# Patient Record
(demographics unavailable — no encounter records)

---

## 2024-12-27 NOTE — DATA REVIEWED
[FreeTextEntry1] : 12/27/2024 bilateral lower extremities venous duplex no acute dvt or svt RLE insuff sfj to gsv prox calf LLE insuff sfj to gsv distal calf

## 2024-12-27 NOTE — ASSESSMENT
[Arterial/Venous Disease] : arterial/venous disease [FreeTextEntry1] : Impression - asymptomatic venous insufficiency, nocturnal LLE pain relieved with walking not related to vascular  seen and examined with Dr. Chavez  Plan Conservative medical management - leg elevation, exercise regimen, calf muscle exercise, knee high 20-30 mm hg compression stockings Advised to follow up with PCP or neuro regarding LLE pain at rest Return to office in 1 year Dec 2025 to re-evaluate bilateral lower extremities with VI study.

## 2024-12-27 NOTE — HISTORY OF PRESENT ILLNESS
[FreeTextEntry1] : Pt presents to the office to evaluate bilateral lower extremities. History of venous insufficiency and left ankle/foot swelling. Left ankle and foot swelling improved. He complains of daily nocturnal LLE pain from the thigh down to the foot when he sleeps. Pain improves when he gets up to walk. He denies claudication, rest pain or wounds. Denies leg swelling. He is compliant with compression stockings. He is physically active and plays pickleball. He takes low dose aspirin. He previously took Eliquis for afib but was discontinued after he developed intracranial hemorrhage. He has a Watchman device. Nonsmoker.

## 2024-12-27 NOTE — PHYSICAL EXAM
[2+] : left 2+ [Ankle Swelling Bilaterally] : bilaterally  [Ankle Swelling (On Exam)] : present [Varicose Veins Of Lower Extremities] : bilaterally [] : bilaterally [No Rash or Lesion] : No rash or lesion [Ankle Swelling On The Right] : mild [Alert] : alert [Oriented to Person] : oriented to person [Oriented to Place] : oriented to place [Oriented to Time] : oriented to time [Calm] : calm [de-identified] : NAD [de-identified] : NCAT [de-identified] : unlabored breathing [FreeTextEntry1] : bilateral lower extremities soft, warm and nontender mild leg edema L>R mild venous stasis changes with dry skin and hyperpigmentation small varicose veins bilaterally no wounds

## 2025-01-15 NOTE — DISCUSSION/SUMMARY
[FreeTextEntry1] : 81 year old man with paroxysmal atrial fibrillation in the setting of hypertension. He has a history of falls and has recent admission for acute on chronic SDH. 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  While he has not had symptomatic sinus bradycardia his sinus rates have been into the 40s. On 1/31/2024 he underwent EP testing and ablation (PVAI and CTI) as well as MANJIT occlusion (24 mm Watchman FLX). For arrhythmia post procedure he was started on amio subsequently lowered to 100 mg daily.  We will check a Zio to assess his heart rates and atrial burden. He may need PPM.   LARRY 5/2024 with no leak and no DRT.  He is on single antiplatelet Rx.

## 2025-01-15 NOTE — HISTORY OF PRESENT ILLNESS
[FreeTextEntry1] : 81 year old man with paroxysmal atrial fibrillation in the setting of hypertension. He has a history of falls and has recent admission for acute on chronic SDH. 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  While he has not had symptomatic sinus bradycardia his sinus rates have been into the 40s. On 1/31/2024 he underwent EP testing and ablation (PVAI and CTI) as well as MANJIT occlusion (24 mm Watchman FLX). For arrhythmia post procedure he was started on amio (subsequently lowered to 100 mg daily).  No palpitations. NO chest pain. No shortness of breath. No lightheadedness/dizziness.  He does get tired playing ball or carrying packages.  These are his only exercising.

## 2025-01-15 NOTE — CARDIOLOGY SUMMARY
[de-identified] : today: Sinus sabino with 3 beat PAT [de-identified] : 5/1/2024  1. Left ventricular systolic function is normal with an ejection fraction of 57 % by 3D.  2. Normal right ventricular cavity size, with normal wall thickness, and normal systolic function.  3. No pericardial effusion seen.  4. A Watchman closure device is present in the left atrial appendage. The left atrial appendage closure device appears to be positioned normally. No residual flow observed. There is no device related thrombus seen.  5. Findings were discussed with Dr. Wilkins on 5/1/2024 at 10.30 am.  6. No left atrial thrombus.  7. No spontaneous echo contrast in the left atrium.  7/8/2022 1. Normal left ventricular systolic function. No segmental wall motion abnormalities. 2. Normal right ventricular size and function. 3. Normal tricuspid valve. Mild tricuspid regurgitation. 4. Estimated pulmonary artery systolic pressure equals 30 mm Hg, assuming right atrial pressure equals 8  mm Hg, consistent with normal pulmonary pressures. [de-identified] : 1/31/2024 successful pulmonary vein antral isolation successful cavotricuspid isthmus ablation

## 2025-01-15 NOTE — CARDIOLOGY SUMMARY
[de-identified] : today: Sinus sabino with 3 beat PAT [de-identified] : 5/1/2024  1. Left ventricular systolic function is normal with an ejection fraction of 57 % by 3D.  2. Normal right ventricular cavity size, with normal wall thickness, and normal systolic function.  3. No pericardial effusion seen.  4. A Watchman closure device is present in the left atrial appendage. The left atrial appendage closure device appears to be positioned normally. No residual flow observed. There is no device related thrombus seen.  5. Findings were discussed with Dr. Wilkins on 5/1/2024 at 10.30 am.  6. No left atrial thrombus.  7. No spontaneous echo contrast in the left atrium.  7/8/2022 1. Normal left ventricular systolic function. No segmental wall motion abnormalities. 2. Normal right ventricular size and function. 3. Normal tricuspid valve. Mild tricuspid regurgitation. 4. Estimated pulmonary artery systolic pressure equals 30 mm Hg, assuming right atrial pressure equals 8  mm Hg, consistent with normal pulmonary pressures. [de-identified] : 1/31/2024 successful pulmonary vein antral isolation successful cavotricuspid isthmus ablation

## 2025-01-15 NOTE — CARDIOLOGY SUMMARY
[de-identified] : today: Sinus sabino with 3 beat PAT [de-identified] : 5/1/2024  1. Left ventricular systolic function is normal with an ejection fraction of 57 % by 3D.  2. Normal right ventricular cavity size, with normal wall thickness, and normal systolic function.  3. No pericardial effusion seen.  4. A Watchman closure device is present in the left atrial appendage. The left atrial appendage closure device appears to be positioned normally. No residual flow observed. There is no device related thrombus seen.  5. Findings were discussed with Dr. Wilkins on 5/1/2024 at 10.30 am.  6. No left atrial thrombus.  7. No spontaneous echo contrast in the left atrium.  7/8/2022 1. Normal left ventricular systolic function. No segmental wall motion abnormalities. 2. Normal right ventricular size and function. 3. Normal tricuspid valve. Mild tricuspid regurgitation. 4. Estimated pulmonary artery systolic pressure equals 30 mm Hg, assuming right atrial pressure equals 8  mm Hg, consistent with normal pulmonary pressures. [de-identified] : 1/31/2024 successful pulmonary vein antral isolation successful cavotricuspid isthmus ablation

## 2025-03-13 NOTE — CARDIOLOGY SUMMARY
[de-identified] : today: Sinus rhythm with late coupled inferiorly directed APCs.  [de-identified] : 5/1/2024  1. Left ventricular systolic function is normal with an ejection fraction of 57 % by 3D.  2. Normal right ventricular cavity size, with normal wall thickness, and normal systolic function.  3. No pericardial effusion seen.  4. A Watchman closure device is present in the left atrial appendage. The left atrial appendage closure device appears to be positioned normally. No residual flow observed. There is no device related thrombus seen.  5. Findings were discussed with Dr. Wilkins on 5/1/2024 at 10.30 am.  6. No left atrial thrombus.  7. No spontaneous echo contrast in the left atrium.  7/8/2022 1. Normal left ventricular systolic function. No segmental wall motion abnormalities. 2. Normal right ventricular size and function. 3. Normal tricuspid valve. Mild tricuspid regurgitation. 4. Estimated pulmonary artery systolic pressure equals 30 mm Hg, assuming right atrial pressure equals 8  mm Hg, consistent with normal pulmonary pressures. [de-identified] : 1/31/2024 successful pulmonary vein antral isolation successful cavotricuspid isthmus ablation

## 2025-03-13 NOTE — DISCUSSION/SUMMARY
[FreeTextEntry1] : 81 year old man with paroxysmal atrial fibrillation in the setting of hypertension. He has a history of falls and has recent admission for acute on chronic SDH. 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  While he has not had symptomatic sinus bradycardia his sinus rates have been into the 40s. On 1/31/2024 he underwent EP testing and ablation (PVAI and CTI) as well as MANJIT occlusion (24 mm Watchman FLX). For arrhythmia post procedure he was started on amio subsequently lowered to 100 mg daily.  Monitoring shows a high burden (20% of SVE) as well as evidence of sinus node dysfunction (max sinus rate of 80 bpm).  LARRY 5/2024 with no leak and no DRT.  He is on single antiplatelet Rx. will plan for repeat ablation... will need AC for 2 months post

## 2025-03-13 NOTE — CARDIOLOGY SUMMARY
[de-identified] : today: Sinus rhythm with late coupled inferiorly directed APCs.  [de-identified] : 5/1/2024  1. Left ventricular systolic function is normal with an ejection fraction of 57 % by 3D.  2. Normal right ventricular cavity size, with normal wall thickness, and normal systolic function.  3. No pericardial effusion seen.  4. A Watchman closure device is present in the left atrial appendage. The left atrial appendage closure device appears to be positioned normally. No residual flow observed. There is no device related thrombus seen.  5. Findings were discussed with Dr. Wilkins on 5/1/2024 at 10.30 am.  6. No left atrial thrombus.  7. No spontaneous echo contrast in the left atrium.  7/8/2022 1. Normal left ventricular systolic function. No segmental wall motion abnormalities. 2. Normal right ventricular size and function. 3. Normal tricuspid valve. Mild tricuspid regurgitation. 4. Estimated pulmonary artery systolic pressure equals 30 mm Hg, assuming right atrial pressure equals 8  mm Hg, consistent with normal pulmonary pressures. [de-identified] : 1/31/2024 successful pulmonary vein antral isolation successful cavotricuspid isthmus ablation

## 2025-03-13 NOTE — CARDIOLOGY SUMMARY
[de-identified] : today: Sinus rhythm with late coupled inferiorly directed APCs.  [de-identified] : 5/1/2024  1. Left ventricular systolic function is normal with an ejection fraction of 57 % by 3D.  2. Normal right ventricular cavity size, with normal wall thickness, and normal systolic function.  3. No pericardial effusion seen.  4. A Watchman closure device is present in the left atrial appendage. The left atrial appendage closure device appears to be positioned normally. No residual flow observed. There is no device related thrombus seen.  5. Findings were discussed with Dr. Wilkins on 5/1/2024 at 10.30 am.  6. No left atrial thrombus.  7. No spontaneous echo contrast in the left atrium.  7/8/2022 1. Normal left ventricular systolic function. No segmental wall motion abnormalities. 2. Normal right ventricular size and function. 3. Normal tricuspid valve. Mild tricuspid regurgitation. 4. Estimated pulmonary artery systolic pressure equals 30 mm Hg, assuming right atrial pressure equals 8  mm Hg, consistent with normal pulmonary pressures. [de-identified] : 1/31/2024 successful pulmonary vein antral isolation successful cavotricuspid isthmus ablation

## 2025-03-13 NOTE — HISTORY OF PRESENT ILLNESS
[FreeTextEntry1] : 81 year old man with paroxysmal atrial fibrillation in the setting of hypertension. He has a history of falls and has recent admission for acute on chronic SDH. 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  While he has not had symptomatic sinus bradycardia his sinus rates have been into the 40s. On 1/31/2024 he underwent EP testing and ablation (PVAI and CTI) as well as MANJIT occlusion (24 mm Watchman FLX). For arrhythmia post procedure he was started on amio subsequently lowered to 100 mg daily.  Monitoring shows a high burden (20% of SVE) as well as evidence of sinus node dysfunction (max sinus rate of 80 bpm).

## 2025-06-18 NOTE — ASSESSMENT
[FreeTextEntry1] : //  Elevated BP, normal here -monitor at home  -f/u in 1 month for annual, sooner if needed    asthma-stable  Paroxysmal atrial fibrillation s/p ablation in the past. as per EP, On 1/31/2024 he underwent EP testing and ablation (PVAI and CTI) as well as MANJIT occlusion (24 mm Watchman FLX). For arrhythmia post procedure he was started on amio subsequently lowered to 100 mg daily. Monitoring shows a high burden (20% of SVE) as well as evidence of sinus node dysfunction (max sinus rate of 80 bpm). -plan for repeat ablation -continue Eliquis, amiodarone  -f/u as per EP   -cont meds as directed -f/u as per cardio  Subdural hematoma, s/p MMA embolization. 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

## 2025-06-18 NOTE — HISTORY OF PRESENT ILLNESS
[de-identified] : 82 year old male with h/o SVT s/p ablation, PAF on eliquis, in 8/2023, suffered SDH following a fall.  s/p MMA embolization.   presents for followup.  accompanied by wife  last seen in 12/2023  noted to elevated BP at home and urgent care.  some headache, otherwise doing okay.  was taking BP on wrist monitor   enjoys playing pickleball, no associated cp or soboe.    lives with wife and daughter Able to perform his ADLs with no issues

## 2025-06-18 NOTE — HISTORY OF PRESENT ILLNESS
[de-identified] : 82 year old male with h/o SVT s/p ablation, PAF on eliquis, in 8/2023, suffered SDH following a fall.  s/p MMA embolization.   presents for followup.  accompanied by wife  last seen in 12/2023  noted to elevated BP at home and urgent care.  some headache, otherwise doing okay.  was taking BP on wrist monitor   enjoys playing pickleball, no associated cp or soboe.    lives with wife and daughter Able to perform his ADLs with no issues